# Patient Record
Sex: MALE | Race: OTHER | URBAN - METROPOLITAN AREA
[De-identification: names, ages, dates, MRNs, and addresses within clinical notes are randomized per-mention and may not be internally consistent; named-entity substitution may affect disease eponyms.]

---
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
## 2022-03-30 ENCOUNTER — EMERGENCY (EMERGENCY)
Facility: HOSPITAL | Age: 22
LOS: 0 days | Discharge: HOME | End: 2022-03-30
Attending: EMERGENCY MEDICINE | Admitting: EMERGENCY MEDICINE
Payer: MEDICAID

## 2022-03-30 VITALS
OXYGEN SATURATION: 100 % | HEART RATE: 103 BPM | TEMPERATURE: 97 F | WEIGHT: 229.94 LBS | SYSTOLIC BLOOD PRESSURE: 134 MMHG | DIASTOLIC BLOOD PRESSURE: 69 MMHG | RESPIRATION RATE: 20 BRPM

## 2022-03-30 DIAGNOSIS — S61.011A LACERATION WITHOUT FOREIGN BODY OF RIGHT THUMB WITHOUT DAMAGE TO NAIL, INITIAL ENCOUNTER: ICD-10-CM
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
## 2022-03-30 DIAGNOSIS — L08.9 LOCAL INFECTION OF THE SKIN AND SUBCUTANEOUS TISSUE, UNSPECIFIED: ICD-10-CM
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
## 2022-03-30 PROCEDURE — 99283 EMERGENCY DEPT VISIT LOW MDM: CPT

## 2022-03-30 NOTE — ED ADULT TRIAGE NOTE - CHIEF COMPLAINT QUOTE
left thumb injury few days ago - reports redness to knuckle with purulent drainage. patient has full rom to thumb

## 2022-03-30 NOTE — ED PROVIDER NOTE - PHYSICAL EXAMINATION
PHYSICAL EXAM:  Respiratory: No chest wall deformity, normal respiratory pattern, clear to auscultation bilaterally  Cardiovascular: Regular rate and rhythm. S1 and S2 Normal; No murmurs, gallops or rubs  Extremities: Full range of motion to thumb, mild erythema and edema, non fluctuant, area clean dry with scab intact no drainage. No cyanosis.

## 2022-03-30 NOTE — ED PROVIDER NOTE - CARE PROVIDER_API CALL
Gina Ramon)  Pediatrics  [address] [place] [address]  Phone: [phone]  Fax: [phone]  Follow Up Time: 1-3 Days

## 2022-03-30 NOTE — ED PROVIDER NOTE - OBJECTIVE STATEMENT
21 yo M who cut his right thumb " a couple of days ago I can't remember which day" while fixing his car.   He reports washing the wound with soap and water and then letting it be. Did not ice, take meds, cover, apply ointment.   Yesterday he noted some swelling and today expressed a small amount of puss from the small scab so came to ED to have it evaluated.   No fevers or decrease range of motion.    NO PMH  PMD: Tristen. Vaccines UTD.

## 2022-03-30 NOTE — ED PROVIDER NOTE - PATIENT PORTAL LINK FT
You can access the FollowMyHealth Patient Portal offered by [facility] by registering at the following website: [email or web address]. By joining [facility]’s FollowMyHealth portal, you will also be able to view your health information using other applications (apps) compatible with our system.

## 2022-03-30 NOTE — ED PROVIDER NOTE - ATTENDING CONTRIBUTION TO CARE
22-year-old male with no past medical history, vaccines up-to-date, presenting with possible infection to left thumb.  Patient had injury where he hit metal on car door resulting in a small laceration to the dorsum of his left thumb over the DIP.  Patient washed it immediately with soap and water.  Yesterday patient noticed some pus which he was able to express from the wound.  Patient is able to fully move his thumb without difficulty or pain.  No fever.  PMD is Dr. Ramon.  Exam - Gen - NAD, Head - NCAT, Skin - No rash, Extremities -left first digit - FROM, no edema, minimal erythema surrounding a less than half centimeter scab over the DIP, no ecchymosis, Neuro - CN 2-12 intact, nl strength and sensation, nl gait.  Diagnosis–infection over left thumb.  Plan–bacitracin.  Minimal erythema, FROM, no tenderness, does not require oral Abx. Patient discharged home with bacitracin, advised to follow-up with PMD.  Given return precautions.

## 2022-03-30 NOTE — ED PROVIDER NOTE - NSFOLLOWUPINSTRUCTIONS_ED_ALL_ED_FT
Abrasion  An abrasion is a cut or a scrape on the outer surface of the skin. An abrasion does not go through all the layers of the skin. It is important to care for your abrasion properly to prevent infection.    What are the causes?  This condition is caused by falling on or gliding across the ground or another surface. When your skin rubs on something, the outer and inner layers of skin may rub off.    What are the signs or symptoms?  The main symptom of this condition is a cut or a scrape. The scrape may be bleeding, or it may appear red or pink. If the abrasion was caused by a fall, there may be a bruise under the cut or scrape.    How is this diagnosed?  An abrasion is diagnosed with a physical exam.    How is this treated?  Treatment for this condition depends on how large and deep the abrasion is. In most cases:    Your abrasion will be cleaned with water and mild soap. This is done to remove any dirt or debris (such as particles of glass or rock) that may be stuck in the wound.  An antibiotic ointment may be applied to the abrasion to help prevent infection.  A bandage (dressing) may be placed on the abrasion to keep it clean.    You may also need a tetanus shot.    Follow these instructions at home:  Medicines     Take or apply over-the-counter and prescription medicines only as told by your health care provider.  If you were prescribed an antibiotic medicine, apply it as told by your health care provider.  Wound care     Clean the wound 2–3 times a day, or as directed by your health care provider. To do this, wash the wound with mild soap and water, rinse off the soap, and pat the wound dry with a clean towel. Do not rub the wound.  Keep the dressing clean and dry as told by your health care provider.  There are many different ways to close and cover a wound. Follow instructions from your health care provider about:    Caring for your wound.  Changing and removing your dressing. You may have to change your dressing one or more times a day, or as directed by your health care provider.    Check your wound every day for signs of infection. Check for:    Redness, particularly a red streak that spreads out from the wound.  Swelling or increased pain.  Warmth.  Fluid, pus, or a bad smell.    If directed, put ice on the injured area to reduce pain and swelling:    Put ice in a plastic bag.   Place a towel between your skin and the bag.   Leave the ice on for 20 minutes, 2–3 times a day.    General instructions     Do not take baths, swim, or use a hot tub until your health care provider says it is okay to do so.  If possible, raise (elevate) the injured area above the level of your heart while you are sitting or lying down. This will reduce pain and swelling.  Keep all follow-up visits as directed by your health care provider. This is important.  Contact a health care provider if:  You received a tetanus shot, and you have swelling, severe pain, redness, or bleeding at the injection site.  Your pain is not controlled with medicine.  You have redness, swelling, or more pain at the site of your wound.  Get help right away if:  You have a red streak spreading away from your wound.  You have a fever.  You have fluid, blood, or pus coming from your wound.  You notice a bad smell coming from your wound or your dressing.  Summary  An abrasion is a cut or a scrape on the outer surface of the skin. An abrasion does not go through all the layers of the skin.  Care for your abrasion properly to prevent infection.  Clean the wound with mild soap and water 2–3 times a day. Follow instructions from your health care provider about taking medicines and changing your bandage (dressing).  Contact your health care provider if you have redness, swelling or more pain in the wound area.  Get help right away if you have a fever or if you have fluid, blood, pus, a bad smell, or a red streak coming from the wound.  This information is not intended to replace advice given to you by your health care provider. Make sure you discuss any questions you have with your health care provider.    Cellulitis    Cellulitis is a skin infection caused by bacteria. This condition occurs most often in the arms and lower legs but can occur anywhere over the body. Symptoms include redness, swelling, warm skin, tenderness, and chills/fever. If you were prescribed an antibiotic medicine, take it as told by your health care provider. Do not stop taking the antibiotic even if you start to feel better.    SEEK IMMEDIATE MEDICAL CARE IF YOU HAVE ANY OF THE FOLLOWING SYMPTOMS: worsening fever, red streaks coming from affected area, vomiting or diarrhea, or dizziness/lightheadedness. or if symptoms don't improve

## 2022-04-20 NOTE — ED PROVIDER NOTE - EKG #1 DATE/TIME
Brief Operative Note    Patient: Dipesh Morgan 39 year old male    MRN: [id number]    Surgeon(s): Alonso Kenney MD  Phone Number: [phone]                       Surgeon(s) and Role:     * Alonso Kenney MD - Primary    Assistant(s): x1    Pre-Op Diagnosis:   Failure of total wrist fusion     Post-Op Diagnosis: same     Procedure: Procedure(s):  LEFT WRIST REVISION FUSION    Anesthesia Type: General + regional                                   Complications: None    Description: as dictated    Findings: partial lunate-capitate fusion.  No lunate-radius fusion, no scaphoid-radius or TQ-radius fusion.  Broken hardware    Specimens Removed:   ID Type Source Tests Collected by Time   1 : Left wrist periprosthetic Tissue Joint, Wrist, Left JOINT PROSTHETIC (14 DAY) ANAEROBE/AEROBE, BACTERIAL CULT WITH GRAM STAIN Alonso Kenney MD 4/15/2022 0822   2 : Left wrist periposthetic under the plate Tissue Joint, Wrist, Left JOINT PROSTHETIC (14 DAY) ANAEROBE/AEROBE, BACTERIAL CULT WITH GRAM STAIN Alonso Kenney MD 4/15/2022 0831        Estimated Blood Loss: Min    Assistant Tasks: retraction     Implants:   Implant Name Type Inv. Item Serial No.  Lot No. LRB No. Used Action   MMIS - PLATE BN WRIST FSN NTRL NS - SN/A Plate MMIS - PLATE BN WRIST FSN NTRL NS N/A [employer] . N/A Left 1 Implanted   MMIS - SCREW BN 2.3MM 18MM LOCK MULT TIFFANIE ANG HEXALOBE TI - SN/A Screw MMIS - SCREW BN 2.3MM 18MM LOCK MULT TIFFANIE ANG HEXALOBE TI N/A [employer] . N/A Left 1 Implanted   MMIS - SCREW BN 2.3MM 16MM LOCK MULT TIFFANIE ANG HEXALOBE TI - SN/A Screw MMIS - SCREW BN 2.3MM 16MM LOCK MULT TIFFANIE [employer]G HEXALOBE TI N/A [employer] . N/A Left 1 Implanted   MMIS - SCREW BN 2.3MM 16MM LOCK MULT TIFFANIE ANG HEXALOBE TI - SN/A Screw MMIS - SCREW BN 2.3MM 16MM LOCK MULT TIFFANIE ANG HEXALOBE TI N/A [employer] . N/A Left 1 Implanted   MMIS - SCREW BN 2.3MM 16MM LOCK MULT TIFFANIE ANG HEXALOBE TI - SN/A Screw MMIS - SCREW BN 2.3MM 16MM LOCK MULT TIFFANIE ANG HEXALOBE  TI N/A [employer] . N/A Left 1 Implanted   MMIS - SCREW BN 2.3MM 20MM LOCK MULT TIFFANIE ANG HEXALOBE TI - SN/A Screw MMIS - SCREW BN 2.3MM 20MM LOCK MULT TIFFANIE ANG HEXALOBE TI N/A [employer] . N/A Left 1 Implanted   MMIS - SCREW BN 3.5MM 16MM HEXALOBE TI NS PAZ SM FRAG - SN/A Screw MMIS - SCREW BN 3.5MM 16MM HEXALOBE TI NS PAZ SM FRAG N/A [employer] . N/A Left 1 Implanted   MMIS - SCREW BN 3.5MM 16MM LOCK HEXALOBE TI NS PAZ SM - SN/A Screw MMIS - SCREW BN 3.5MM 16MM LOCK HEXALOBE TI NS PAZ SM N/A [employer] . N/A Left 1 Implanted   MMIS - SCREW BN 3.5MM 16MM LOCK HEXALOBE TI NS PAZ SM - SN/A Screw MMIS - SCREW BN 3.5MM 16MM LOCK HEXALOBE TI NS PAZ SM N/A [employer] . N/A Left 1 Implanted   MMIS - SCREW BN 3.5MM 18MM LOCK HEXALOBE TI NS PAZ SM - SN/A Screw MMIS - SCREW BN 3.5MM 18MM LOCK HEXALOBE TI NS PAZ SM N/A [employer] . N/A Left 1 Implanted   MMIS - SCREW BN 3.5MM 18MM LOCK HEXALOBE TI NS PAZ SM - SN/A Screw MMIS - SCREW BN 3.5MM 18MM LOCK HEXALOBE TI NS PAZ SM N/A [employer] . N/A Left 1 Implanted   MMIS - GRAFT 1-8MM CANC ALLOGRAFT CHIP BN - [id number] Bone MMIS - GRAFT 1-8MM CANC ALLOGRAFT CHIP BN [id number] [employer] . [id number] Left 1 Implanted         I was present for the key portions of the procedure and was immediately available for the non-key portions       04-Sep-2020 22:10

## 2022-08-15 ENCOUNTER — NON-APPOINTMENT (OUTPATIENT)
Age: 22
End: 2022-08-15

## 2022-10-11 ENCOUNTER — NON-APPOINTMENT (OUTPATIENT)
Age: 22
End: 2022-10-11

## 2022-10-12 ENCOUNTER — EMERGENCY (EMERGENCY)
Facility: HOSPITAL | Age: 22
LOS: 0 days | Discharge: HOME | End: 2022-10-12
Attending: PEDIATRICS | Admitting: PEDIATRICS

## 2022-10-12 VITALS
HEART RATE: 80 BPM | OXYGEN SATURATION: 100 % | DIASTOLIC BLOOD PRESSURE: 61 MMHG | SYSTOLIC BLOOD PRESSURE: 121 MMHG | TEMPERATURE: 98 F | RESPIRATION RATE: 20 BRPM

## 2022-10-12 VITALS
DIASTOLIC BLOOD PRESSURE: 70 MMHG | HEART RATE: 81 BPM | SYSTOLIC BLOOD PRESSURE: 120 MMHG | WEIGHT: 246.04 LBS | RESPIRATION RATE: 18 BRPM | TEMPERATURE: 97 F | OXYGEN SATURATION: 98 %

## 2022-10-12 DIAGNOSIS — Z79.82 LONG TERM (CURRENT) USE OF ASPIRIN: ICD-10-CM

## 2022-10-12 DIAGNOSIS — R07.81 PLEURODYNIA: ICD-10-CM

## 2022-10-12 DIAGNOSIS — R63.0 ANOREXIA: ICD-10-CM

## 2022-10-12 DIAGNOSIS — R10.31 RIGHT LOWER QUADRANT PAIN: ICD-10-CM

## 2022-10-12 DIAGNOSIS — R11.0 NAUSEA: ICD-10-CM

## 2022-10-12 LAB
ALBUMIN SERPL ELPH-MCNC: 4.4 G/DL — SIGNIFICANT CHANGE UP (ref 3.5–5.2)
ALP SERPL-CCNC: 65 U/L — SIGNIFICANT CHANGE UP (ref 30–115)
ALT FLD-CCNC: 22 U/L — SIGNIFICANT CHANGE UP (ref 13–38)
ANION GAP SERPL CALC-SCNC: 13 MMOL/L — SIGNIFICANT CHANGE UP (ref 7–14)
APPEARANCE UR: CLEAR — SIGNIFICANT CHANGE UP
AST SERPL-CCNC: 14 U/L — SIGNIFICANT CHANGE UP (ref 13–38)
BASOPHILS # BLD AUTO: 0.02 K/UL — SIGNIFICANT CHANGE UP (ref 0–0.2)
BASOPHILS NFR BLD AUTO: 0.2 % — SIGNIFICANT CHANGE UP (ref 0–1)
BILIRUB SERPL-MCNC: 0.8 MG/DL — SIGNIFICANT CHANGE UP (ref 0.2–1.2)
BILIRUB UR-MCNC: NEGATIVE — SIGNIFICANT CHANGE UP
BUN SERPL-MCNC: 12 MG/DL — SIGNIFICANT CHANGE UP (ref 10–20)
CALCIUM SERPL-MCNC: 9.2 MG/DL — SIGNIFICANT CHANGE UP (ref 8.4–10.4)
CHLORIDE SERPL-SCNC: 100 MMOL/L — SIGNIFICANT CHANGE UP (ref 98–110)
CO2 SERPL-SCNC: 27 MMOL/L — SIGNIFICANT CHANGE UP (ref 17–32)
COLOR SPEC: COLORLESS — SIGNIFICANT CHANGE UP
CREAT SERPL-MCNC: 0.8 MG/DL — SIGNIFICANT CHANGE UP (ref 0.7–1.5)
DIFF PNL FLD: NEGATIVE — SIGNIFICANT CHANGE UP
EGFR: 130 ML/MIN/1.73M2 — SIGNIFICANT CHANGE UP
EOSINOPHIL # BLD AUTO: 0.11 K/UL — SIGNIFICANT CHANGE UP (ref 0–0.7)
EOSINOPHIL NFR BLD AUTO: 0.8 % — SIGNIFICANT CHANGE UP (ref 0–8)
GLUCOSE SERPL-MCNC: 81 MG/DL — SIGNIFICANT CHANGE UP (ref 70–99)
GLUCOSE UR QL: NEGATIVE — SIGNIFICANT CHANGE UP
HCT VFR BLD CALC: 43.5 % — SIGNIFICANT CHANGE UP (ref 42–52)
HGB BLD-MCNC: 14.8 G/DL — SIGNIFICANT CHANGE UP (ref 14–18)
IMM GRANULOCYTES NFR BLD AUTO: 0.3 % — SIGNIFICANT CHANGE UP (ref 0.1–0.3)
KETONES UR-MCNC: NEGATIVE — SIGNIFICANT CHANGE UP
LACTATE SERPL-SCNC: 0.9 MMOL/L — SIGNIFICANT CHANGE UP (ref 0.7–2)
LEUKOCYTE ESTERASE UR-ACNC: NEGATIVE — SIGNIFICANT CHANGE UP
LIDOCAIN IGE QN: 11 U/L — SIGNIFICANT CHANGE UP (ref 7–60)
LYMPHOCYTES # BLD AUTO: 1.76 K/UL — SIGNIFICANT CHANGE UP (ref 1.2–3.4)
LYMPHOCYTES # BLD AUTO: 13.5 % — LOW (ref 20.5–51.1)
MCHC RBC-ENTMCNC: 31.8 PG — HIGH (ref 27–31)
MCHC RBC-ENTMCNC: 34 G/DL — SIGNIFICANT CHANGE UP (ref 32–37)
MCV RBC AUTO: 93.3 FL — SIGNIFICANT CHANGE UP (ref 80–94)
MONOCYTES # BLD AUTO: 1.22 K/UL — HIGH (ref 0.1–0.6)
MONOCYTES NFR BLD AUTO: 9.4 % — HIGH (ref 1.7–9.3)
NEUTROPHILS # BLD AUTO: 9.89 K/UL — HIGH (ref 1.4–6.5)
NEUTROPHILS NFR BLD AUTO: 75.8 % — HIGH (ref 42.2–75.2)
NITRITE UR-MCNC: NEGATIVE — SIGNIFICANT CHANGE UP
NRBC # BLD: 0 /100 WBCS — SIGNIFICANT CHANGE UP (ref 0–0)
PH UR: 8 — SIGNIFICANT CHANGE UP (ref 5–8)
PLATELET # BLD AUTO: 259 K/UL — SIGNIFICANT CHANGE UP (ref 130–400)
POTASSIUM SERPL-MCNC: 4.4 MMOL/L — SIGNIFICANT CHANGE UP (ref 3.5–5)
POTASSIUM SERPL-SCNC: 4.4 MMOL/L — SIGNIFICANT CHANGE UP (ref 3.5–5)
PROT SERPL-MCNC: 7 G/DL — SIGNIFICANT CHANGE UP (ref 6–8)
PROT UR-MCNC: NEGATIVE — SIGNIFICANT CHANGE UP
RBC # BLD: 4.66 M/UL — LOW (ref 4.7–6.1)
RBC # FLD: 12.3 % — SIGNIFICANT CHANGE UP (ref 11.5–14.5)
SODIUM SERPL-SCNC: 140 MMOL/L — SIGNIFICANT CHANGE UP (ref 135–146)
SP GR SPEC: 1.01 — LOW (ref 1.01–1.03)
UROBILINOGEN FLD QL: SIGNIFICANT CHANGE UP
WBC # BLD: 13.04 K/UL — HIGH (ref 4.8–10.8)
WBC # FLD AUTO: 13.04 K/UL — HIGH (ref 4.8–10.8)

## 2022-10-12 PROCEDURE — 99285 EMERGENCY DEPT VISIT HI MDM: CPT

## 2022-10-12 PROCEDURE — 74177 CT ABD & PELVIS W/CONTRAST: CPT | Mod: 26,MA

## 2022-10-12 RX ORDER — DIATRIZOATE MEGLUMINE 180 MG/ML
30 INJECTION, SOLUTION INTRAVESICAL ONCE
Refills: 0 | Status: COMPLETED | OUTPATIENT
Start: 2022-10-12 | End: 2022-10-12

## 2022-10-12 RX ORDER — SODIUM CHLORIDE 9 MG/ML
1000 INJECTION INTRAMUSCULAR; INTRAVENOUS; SUBCUTANEOUS ONCE
Refills: 0 | Status: COMPLETED | OUTPATIENT
Start: 2022-10-12 | End: 2022-10-12

## 2022-10-12 RX ADMIN — DIATRIZOATE MEGLUMINE 30 MILLILITER(S): 180 INJECTION, SOLUTION INTRAVESICAL at 12:48

## 2022-10-12 RX ADMIN — SODIUM CHLORIDE 1000 MILLILITER(S): 9 INJECTION INTRAMUSCULAR; INTRAVENOUS; SUBCUTANEOUS at 12:48

## 2022-10-12 NOTE — ED PEDIATRIC NURSE NOTE - ENVIRONMENTAL FACTORS
Pt c/o lower abdominal pain since yesterday, c/o diarrhea, denies urinary symptoms. (1) Outpatient Area

## 2022-10-12 NOTE — ED PROVIDER NOTE - CLINICAL SUMMARY MEDICAL DECISION MAKING FREE TEXT BOX
pt with rlq pain wbc 13 CT showing no signs of appendix. Pain improved. Pt tolerated PO in ed. Results given. Strict return precautions given regarding worsening or new symptoms. Ok for dc.

## 2022-10-12 NOTE — ED PROVIDER NOTE - PATIENT PORTAL LINK FT
You can access the FollowMyHealth Patient Portal offered by [facility] by registering at the following website: [email or web address]. By joining [employer]’s FollowMyHealth portal, you will also be able to view your health information using other applications (apps) compatible with our system.

## 2022-10-12 NOTE — ED PROVIDER NOTE - OBJECTIVE STATEMENT
21 yo M presents with right sided abd pain since this morning. Associated with decrease in po intake and some nausea. No vomiting. Denies any testicular pain. No hematuria or dysuria. States its constant in nature.  No fever or chills. No diarrhea. having normal BMs.

## 2022-11-22 NOTE — PATIENT PROFILE PEDIATRIC. - NS PRO AFRAID ANYONE YN PEDS
no

## 2022-12-21 NOTE — ED PROVIDER NOTE - PROVIDER TOKENS
Patient is in the lateral left side position. PROVIDER:[TOKEN:[[id number]],FOLLOWUP:[4-6 Days]]

## 2023-09-14 ENCOUNTER — EMERGENCY (EMERGENCY)
Facility: HOSPITAL | Age: 23
LOS: 0 days | Discharge: HOME | End: 2023-09-14
Attending: EMERGENCY MEDICINE
Payer: MEDICAID

## 2023-09-14 VITALS
TEMPERATURE: 98 F | HEIGHT: 72 IN | HEART RATE: 80 BPM | RESPIRATION RATE: 18 BRPM | DIASTOLIC BLOOD PRESSURE: 82 MMHG | WEIGHT: 229.94 LBS | SYSTOLIC BLOOD PRESSURE: 119 MMHG | OXYGEN SATURATION: 98 %

## 2023-09-14 DIAGNOSIS — Z79.82 LONG TERM (CURRENT) USE OF ASPIRIN: ICD-10-CM

## 2023-09-14 DIAGNOSIS — T49.4X1S: ICD-10-CM

## 2023-09-14 DIAGNOSIS — R11.10 VOMITING, UNSPECIFIED: ICD-10-CM

## 2023-09-14 PROCEDURE — 99284 EMERGENCY DEPT VISIT MOD MDM: CPT

## 2023-09-14 NOTE — ED PROVIDER NOTE - CLINICAL SUMMARY MEDICAL DECISION MAKING FREE TEXT BOX
23yM p/w accidental ingestion of diluted bleach, s/p vomiting.  Pt well appearing, hemodynamically stable w/o resp distress or skin evidence of caustic burn.  D/w tox, recommend PO challenge and carafate if symptomatic on attempt.  Pt tolerating PO w/o distress.  No SI/HI.  Recommend supportive care, o/p f/u, return precautions.

## 2023-09-14 NOTE — CONSULT NOTE ADULT - SUBJECTIVE AND OBJECTIVE BOX
MEDICAL TOXICOLOGY CONSULT    HPI:23 y m presenting with caustic ingestion      ONSET / TIME of exposure(s): 30 min to 1 hr prior to arrival (1.5-2 hr ago)    QUANTITY of exposure(s): 3 ml diluted bleach    ROUTE of exposure:  ingestion    CONTEXT of exposure: at home    ASSOCIATED symptoms: vomiting x2 prior to arrival, asymptomatic now with no stridor, vomiting, pooling secretions, denies any cp, sob, difficulty breathing,     PAST MEDICAL & SURGICAL HISTORY:  Obesity    FAMILY HISTORY:  No pertinent family history in first degree relatives        Vital Signs Last 24 Hrs  T(C): 36.7 (14 Sep 2023 20:22), Max: 36.7 (14 Sep 2023 20:22)  T(F): 98.1 (14 Sep 2023 20:22), Max: 98.1 (14 Sep 2023 20:22)  HR: 80 (14 Sep 2023 20:22) (80 - 80)  BP: 119/82 (14 Sep 2023 20:22) (119/82 - 119/82)  BP(mean): --  RR: 18 (14 Sep 2023 20:22) (18 - 18)  SpO2: 98% (14 Sep 2023 20:22) (98% - 98%)    Parameters below as of 14 Sep 2023 20:22  Patient On (Oxygen Delivery Method): room air        SIGNIFICANT LABORATORY STUDIES:

## 2023-09-14 NOTE — ED PROVIDER NOTE - NS ED MD DISPO DISCHARGE CCDA
Patient/Caregiver provided printed discharge information.
I, Ada Wolff, performed a face to face bedside interview with this patient regarding history of present illness, review of symptoms and relevant past medical, social and family history.  I completed an independent physical examination. Medical decision making, follow-up on ordered tests (ie labs, radiologic studies) and re-evaluation of the patient's status has been communicated to the ACP.  Disposition of the patient will be based on test outcome and response to ED interventions.

## 2023-09-14 NOTE — ED PROVIDER NOTE - NSFOLLOWUPINSTRUCTIONS_ED_ALL_ED_FT
Nontoxic Ingestion, Adult  Nontoxic ingestion happens when a person swallows, or ingests, a substance that is not likely to cause serious medical problems (nontoxic substance). Nontoxic ingestion involves a substance that is not food and is not poisonous. Some nontoxic substances can cause harm if a large enough amount is ingested.    Commonly ingested nontoxic substances include:  Chalk, ink, pencils, and water-based paint.  Birth control (contraceptive)pills and vitamins without iron.  Dog or cat food.  Lipstick or other makeup, perfume, and small amounts of non-medicated hair products.  Petroleum jelly and other gels or creams used on skin (topical ointments).  Potting soil, dirt, or insects.  Shaving cream and talcum powder.  Call your local poison control center at 1-392.338.4247 if you are not sure whether the substance that you swallowed is nontoxic or toxic. If you develop severe symptoms, get help right away.    What are the causes?  Most of the time, nontoxic ingestion is accidental. For example, some hairspray or makeup may get in your mouth by accident, or you may mistake a birth control pill for another medicine.    What are the signs or symptoms?  Nontoxic ingestion usually does not cause symptoms. It may leave a bad taste in your mouth. Spitting or gagging may also occur. Sometimes, it can take a while for the signs or symptoms to develop.    How is this diagnosed?  This condition is diagnosed based on your symptoms, if any, and your medical history. Your health care provider will ask about the substance that you ingested, including how much you swallowed and when you swallowed it. The health care provider will also do a physical exam to check for symptoms of poisoning.    How is this treated?  Treatment is not usually needed for this condition. Your health care provider may monitor you for a time to make sure symptoms do not develop.    Follow these instructions at home:  Eating and drinking    Three cups showing dark yellow, yellow, and pale yellow urine.   Follow instructions from your health care provider about eating or drinking restrictions.  If you have vomited after ingesting the substance, you may need to wait a few hours before eating. Start with small sips of clear liquids until your stomach settles.  Drink enough fluid to keep your urine pale yellow.  General instructions    Watch for any change in your condition or for new symptoms.  Take over-the-counter and prescription medicines only as told by your health care provider.  How is this prevented?  Make sure all pills and vitamins are clearly labeled.  Store products and medicines in their original containers.  Read directions for medicines carefully.  Store makeup and non-edible products away from food and medicines.  Read all directions for hair and makeup products carefully.  Use aerosol products in an area that has a lot of airflow.  Where to find more information  Poison Control: poison.org  Contact a health care provider if:  You have any new or worse symptoms.  You have a fever.  You vomit.  You cough.  You have any signs of dehydration. These may include:  Severe thirst.  Dry lips and mouth.  Dizziness or confusion.  Dark urine or no urge to urinate.  Fast breathing or fast pulse.  Get help right away if:  You have trouble walking, swallowing, or breathing.  You have chest pain, or fast or irregular heartbeats (palpitations).  You have severe tiredness (fatigue) or weakness.  You have a seizure.  You have pain in your abdomen, repeated vomiting, or severe diarrhea.  You have any bleeding from the mouth or rectum.  These symptoms may be an emergency. Get help right away. Call 911.  Do not wait to see if the symptoms will go away.  Do not drive yourself to the hospital.  Summary  Nontoxic ingestion occurs when you swallow a substance that is not food. It is not likely to cause serious medical problems.  Commonly ingested nontoxic items include makeup, ink, shaving cream, dog or cat food, and vitamins without iron.  Nontoxic ingestion usually does not need treatment, but your health care provider may monitor you for a time to make sure symptoms do not develop.  Call your local poison control center at 1-880.563.1333 if you are not sure whether the substance you swallowed is nontoxic or toxic.  This information is not intended to replace advice given to you by your health care provider. Make sure you discuss any questions you have with your health care provider.

## 2023-09-14 NOTE — ED ADULT TRIAGE NOTE - CHIEF COMPLAINT QUOTE
BIBEMS from home. pt states he accidently drank bleach, and started vomiting right away about 3-4x.  pt states him and his mom in the process of moving and he picked up a water bottle filled it with water and started drinking, after he drank he realized it had bleach in it.   pt denies SI/HI.  pt drank half a water bottle in EMS.  pt denies buring in his throat but reports slight abd  burning.

## 2023-09-14 NOTE — ED PROVIDER NOTE - OBJECTIVE STATEMENT
Patient is a 23 year old male with no significant PMH presenting for an ingestion. Patient states he took a few sips (~3ml) of liquid from a water bottle that unknowingly was mixed with bleach; patient states he acutely vomited 2-3 times immediately after ingesting the liquid and now is asymptomatic. Patient denies sore throat, difficulty breathing, chest pain, nausea, or additional complaints.

## 2023-09-14 NOTE — CONSULT NOTE ADULT - ASSESSMENT
Assessment	  Concern for    Toxicologic Context:     Recommendations:  Treatment: Caustics are generally acidic or alkali chemicals that can cause direct mucocutaneous and esophagogastric injury of varying degrees. In terms of ingestion, symptoms of emesis, abdominal pain, drooling, and stridor are predictors of more severe injury. Visible lesions on the cheeks, lips, or in the oropharynx are also predictors of severe injury. Intentional, large volume ingestions, and/or acidic chemicals are also associated with more severe injury. Complications include necrosis, stricture formation, and/or perforation. Please see the following publication for a summary of assessment and management of caustic ingestions DOI: 10.1056/[id number].  1)  Trial PO, if able to tolerate PO can be cleared from toxicologic standpoint  2) treat symptomatically with carafate for symptomatic relief  All plans discussed with primary managing team.    Thank you for the consultation. Please reach out via Teams with any questions.  Everton Loomis MD, Medical Toxicology Fellow

## 2023-09-14 NOTE — ED PROVIDER NOTE - PROGRESS NOTE DETAILS
SO: Toxicology Consulted SO: Patient tolerated PO well and is feeling fine at this time. Will discharge patient. SO: Per Toxicology, as long as patient can tolerate PO he is cleared for discharge.

## 2023-09-14 NOTE — ED PROVIDER NOTE - ATTENDING CONTRIBUTION TO CARE
23yM otherwise healthy accidentally drank from a bottle of diluted bleach - estimates ~3oz ingested - vomited 4x afterwards (spontaneously - was not trying to induce vomiting).  Now feels back to baseline.

## 2023-09-14 NOTE — ED PROVIDER NOTE - PATIENT PORTAL LINK FT
You can access the FollowMyHealth Patient Portal offered by [facility] by registering at the following website: [email or web address]. By joining [facility]’s FollowMyHealth portal, you will also be able to view your health information using other applications (apps) compatible with our system.

## 2024-02-23 ENCOUNTER — NON-APPOINTMENT (OUTPATIENT)
Age: 24
End: 2024-02-23

## 2025-04-01 NOTE — PRE-ANESTHESIA EVALUATION ADULT - NSANTHBPHIGHRD_ENT_A_CORE
04/01/25 0932   Margins Defined edges 04/01/25 0932   Wound Thickness Description not for Pressure Injury Full thickness 04/01/25 0932   Number of days: 131          Percent of Wound(s)/Ulcer(s) Debrided: 100%    Total Surface Area Debrided:  23sq cm       Diabetic/Pressure/Non Pressure Ulcers only:  Ulcer: Non-Pressure ulcer, fat layer exposed      Estimated Blood Loss:  None    Hemostasis Achieved:  not needed    Procedural Pain:  1  / 10     Post Procedural Pain:  1 / 10     Response to treatment:  Well tolerated by patient.       Plan:     Treatment Note please see Discharge Instructions    Written patient dismissal instructions given to patient and signed by patient or POA.             Electronically signed by Arthur Delos Reyes, MD on 4/1/2025 at 9:44 AM     No